# Patient Record
Sex: FEMALE | Race: WHITE | NOT HISPANIC OR LATINO | URBAN - METROPOLITAN AREA
[De-identification: names, ages, dates, MRNs, and addresses within clinical notes are randomized per-mention and may not be internally consistent; named-entity substitution may affect disease eponyms.]

---

## 2019-02-05 ENCOUNTER — EMERGENCY (EMERGENCY)
Facility: HOSPITAL | Age: 24
LOS: 0 days | Discharge: HOME | End: 2019-02-05
Attending: EMERGENCY MEDICINE | Admitting: EMERGENCY MEDICINE
Payer: COMMERCIAL

## 2019-02-05 VITALS
TEMPERATURE: 98 F | DIASTOLIC BLOOD PRESSURE: 80 MMHG | RESPIRATION RATE: 16 BRPM | SYSTOLIC BLOOD PRESSURE: 114 MMHG | HEART RATE: 73 BPM | OXYGEN SATURATION: 100 %

## 2019-02-05 VITALS — WEIGHT: 121.92 LBS

## 2019-02-05 DIAGNOSIS — R29.898 OTHER SYMPTOMS AND SIGNS INVOLVING THE MUSCULOSKELETAL SYSTEM: ICD-10-CM

## 2019-02-05 DIAGNOSIS — Z86.19 PERSONAL HISTORY OF OTHER INFECTIOUS AND PARASITIC DISEASES: ICD-10-CM

## 2019-02-05 DIAGNOSIS — R20.0 ANESTHESIA OF SKIN: ICD-10-CM

## 2019-02-05 DIAGNOSIS — H02.843 EDEMA OF RIGHT EYE, UNSPECIFIED EYELID: ICD-10-CM

## 2019-02-05 DIAGNOSIS — R20.2 PARESTHESIA OF SKIN: ICD-10-CM

## 2019-02-05 LAB
ALBUMIN SERPL ELPH-MCNC: 4.7 G/DL — SIGNIFICANT CHANGE UP (ref 3.3–5)
ALP SERPL-CCNC: 61 U/L — SIGNIFICANT CHANGE UP (ref 40–120)
ALT FLD-CCNC: 81 U/L — HIGH (ref 12–78)
ANION GAP SERPL CALC-SCNC: 6 MMOL/L — SIGNIFICANT CHANGE UP (ref 5–17)
APTT BLD: 36 SEC — SIGNIFICANT CHANGE UP (ref 27.5–36.3)
AST SERPL-CCNC: 31 U/L — SIGNIFICANT CHANGE UP (ref 15–37)
BASOPHILS # BLD AUTO: 0.03 K/UL — SIGNIFICANT CHANGE UP (ref 0–0.2)
BASOPHILS NFR BLD AUTO: 0.5 % — SIGNIFICANT CHANGE UP (ref 0–2)
BILIRUB SERPL-MCNC: 1.7 MG/DL — HIGH (ref 0.2–1.2)
BUN SERPL-MCNC: 10 MG/DL — SIGNIFICANT CHANGE UP (ref 7–23)
CALCIUM SERPL-MCNC: 9.2 MG/DL — SIGNIFICANT CHANGE UP (ref 8.5–10.1)
CHLORIDE SERPL-SCNC: 106 MMOL/L — SIGNIFICANT CHANGE UP (ref 96–108)
CO2 SERPL-SCNC: 27 MMOL/L — SIGNIFICANT CHANGE UP (ref 22–31)
CREAT SERPL-MCNC: 0.62 MG/DL — SIGNIFICANT CHANGE UP (ref 0.5–1.3)
EOSINOPHIL # BLD AUTO: 0.04 K/UL — SIGNIFICANT CHANGE UP (ref 0–0.5)
EOSINOPHIL NFR BLD AUTO: 0.7 % — SIGNIFICANT CHANGE UP (ref 0–6)
GLUCOSE SERPL-MCNC: 90 MG/DL — SIGNIFICANT CHANGE UP (ref 70–99)
HCT VFR BLD CALC: 40.9 % — SIGNIFICANT CHANGE UP (ref 34.5–45)
HGB BLD-MCNC: 13.8 G/DL — SIGNIFICANT CHANGE UP (ref 11.5–15.5)
IMM GRANULOCYTES NFR BLD AUTO: 0.2 % — SIGNIFICANT CHANGE UP (ref 0–1.5)
INR BLD: 1.24 RATIO — HIGH (ref 0.88–1.16)
LYMPHOCYTES # BLD AUTO: 1.85 K/UL — SIGNIFICANT CHANGE UP (ref 1–3.3)
LYMPHOCYTES # BLD AUTO: 32 % — SIGNIFICANT CHANGE UP (ref 13–44)
MCHC RBC-ENTMCNC: 30.5 PG — SIGNIFICANT CHANGE UP (ref 27–34)
MCHC RBC-ENTMCNC: 33.7 GM/DL — SIGNIFICANT CHANGE UP (ref 32–36)
MCV RBC AUTO: 90.5 FL — SIGNIFICANT CHANGE UP (ref 80–100)
MONOCYTES # BLD AUTO: 0.29 K/UL — SIGNIFICANT CHANGE UP (ref 0–0.9)
MONOCYTES NFR BLD AUTO: 5 % — SIGNIFICANT CHANGE UP (ref 2–14)
NEUTROPHILS # BLD AUTO: 3.56 K/UL — SIGNIFICANT CHANGE UP (ref 1.8–7.4)
NEUTROPHILS NFR BLD AUTO: 61.6 % — SIGNIFICANT CHANGE UP (ref 43–77)
NRBC # BLD: 0 /100 WBCS — SIGNIFICANT CHANGE UP (ref 0–0)
PLATELET # BLD AUTO: 233 K/UL — SIGNIFICANT CHANGE UP (ref 150–400)
POTASSIUM SERPL-MCNC: 4 MMOL/L — SIGNIFICANT CHANGE UP (ref 3.5–5.3)
POTASSIUM SERPL-SCNC: 4 MMOL/L — SIGNIFICANT CHANGE UP (ref 3.5–5.3)
PROT SERPL-MCNC: 8.6 GM/DL — HIGH (ref 6–8.3)
PROTHROM AB SERPL-ACNC: 13.9 SEC — HIGH (ref 10–12.9)
RBC # BLD: 4.52 M/UL — SIGNIFICANT CHANGE UP (ref 3.8–5.2)
RBC # FLD: 12 % — SIGNIFICANT CHANGE UP (ref 10.3–14.5)
SODIUM SERPL-SCNC: 139 MMOL/L — SIGNIFICANT CHANGE UP (ref 135–145)
WBC # BLD: 5.78 K/UL — SIGNIFICANT CHANGE UP (ref 3.8–10.5)
WBC # FLD AUTO: 5.78 K/UL — SIGNIFICANT CHANGE UP (ref 3.8–10.5)

## 2019-02-05 PROCEDURE — 70450 CT HEAD/BRAIN W/O DYE: CPT | Mod: 26

## 2019-02-05 PROCEDURE — 99284 EMERGENCY DEPT VISIT MOD MDM: CPT

## 2019-02-05 RX ORDER — VALACYCLOVIR 500 MG/1
1 TABLET, FILM COATED ORAL
Qty: 21 | Refills: 0 | OUTPATIENT
Start: 2019-02-05 | End: 2019-02-11

## 2019-02-05 NOTE — ED STATDOCS - OBJECTIVE STATEMENT
23 y/o female with a PMHx of shingles x2 presents to the ED c/o right arm weakness today. Pt woke up this morning with right eye swelling and was told "she has a lot of white blood cells in her eye" and told her to return tomorrow for further testing. Given steroid eye drop with improvement of right eye swelling. Pt also has rhinorrhea and right arm weakness and numbness. Denies neck pain. Optometrist: Dr. Dao.

## 2019-02-05 NOTE — ED STATDOCS - PROGRESS NOTE DETAILS
23 y/o female with a PMHx of shingles x2 presents to the ED c/o right arm weakness today. Pt woke up this morning with right eye swelling and was told "she has a lot of white blood cells in her eye" and told her to return tomorrow for further testing. Given steroid eye drop with improvement of right eye swelling. Pt also has rhinorrhea and right arm weakness and numbness. -Manoj Machuca PA-C Results reviewed and discussed with pt. Sx feel similar to prior shingles, will dc with valacylovir. Pt is well appearing and stable for dc. Discussed importance of close FU with PMD.  Pt asked to return to ED immediately for any new or concerning sx or worsening sx. Pt acknowledges and understands plan. -Manoj Machuca PA-C

## 2019-02-05 NOTE — ED STATDOCS - MEDICAL DECISION MAKING DETAILS
No objective neurologic findings on exam.  CT head negative.  Pt states feels similar to prior shingles episodes.  Will treat in case and patient to keep eye out for rash.  Will also refer to neurology in case there is neurologic cause of her paresthesias, like MS, which would need to be further worked up as outpatient.

## 2019-02-05 NOTE — ED ADULT TRIAGE NOTE - CHIEF COMPLAINT QUOTE
Pt presents to ED c/o weakness in right arm starting this morning around 10am. Pt reports she was seen by eye doc after noting that she had swelling of the right eye this morning who told her "she has a lot of white blood cells in her right eye". Pt does not have a facial droop. Pt denies oral contraceptives and smoking. Pt denies fever. Pt reports numbness in the right side of her face that resolved prior to the weakness in her arm.

## 2019-02-05 NOTE — ED ADULT NURSE NOTE - OBJECTIVE STATEMENT
right arm weakness today. Pt woke up this morning with right eye swelling and was told "she has a lot of white blood cells in her eye" and told her to return tomorrow for further testing.

## 2020-04-26 ENCOUNTER — MESSAGE (OUTPATIENT)
Age: 25
End: 2020-04-26

## 2020-05-10 LAB
SARS-COV-2 IGG SERPL IA-ACNC: <0.1 INDEX
SARS-COV-2 IGG SERPL QL IA: NEGATIVE

## 2021-01-09 DIAGNOSIS — Z11.59 ENCOUNTER FOR SCREENING FOR OTHER VIRAL DISEASES: ICD-10-CM

## 2021-01-10 ENCOUNTER — OUTPATIENT (OUTPATIENT)
Dept: OUTPATIENT SERVICES | Facility: HOSPITAL | Age: 26
LOS: 1 days | End: 2021-01-10
Payer: COMMERCIAL

## 2021-01-10 DIAGNOSIS — Z20.828 CONTACT WITH AND (SUSPECTED) EXPOSURE TO OTHER VIRAL COMMUNICABLE DISEASES: ICD-10-CM

## 2021-01-10 PROBLEM — B02.9 ZOSTER WITHOUT COMPLICATIONS: Chronic | Status: ACTIVE | Noted: 2019-02-06

## 2021-01-10 PROCEDURE — U0005: CPT

## 2021-01-10 PROCEDURE — U0003: CPT

## 2021-01-10 PROCEDURE — C9803: CPT

## 2021-01-11 DIAGNOSIS — Z20.828 CONTACT WITH AND (SUSPECTED) EXPOSURE TO OTHER VIRAL COMMUNICABLE DISEASES: ICD-10-CM

## 2021-01-11 LAB — SARS-COV-2 RNA SPEC QL NAA+PROBE: SIGNIFICANT CHANGE UP

## 2021-07-14 ENCOUNTER — NON-APPOINTMENT (OUTPATIENT)
Age: 26
End: 2021-07-14

## 2021-07-20 ENCOUNTER — APPOINTMENT (OUTPATIENT)
Dept: INTERNAL MEDICINE | Facility: CLINIC | Age: 26
End: 2021-07-20
Payer: COMMERCIAL

## 2021-07-20 VITALS
BODY MASS INDEX: 19.62 KG/M2 | HEIGHT: 67 IN | RESPIRATION RATE: 12 BRPM | HEART RATE: 86 BPM | WEIGHT: 125 LBS | DIASTOLIC BLOOD PRESSURE: 64 MMHG | SYSTOLIC BLOOD PRESSURE: 104 MMHG

## 2021-07-20 DIAGNOSIS — Z13.31 ENCOUNTER FOR SCREENING FOR DEPRESSION: ICD-10-CM

## 2021-07-20 DIAGNOSIS — Z78.9 OTHER SPECIFIED HEALTH STATUS: ICD-10-CM

## 2021-07-20 DIAGNOSIS — Z13.39 ENCOUNTER FOR SCREENING EXAM FOR OTHER MENTAL HEALTH AND BEHAVIORAL DISORDERS: ICD-10-CM

## 2021-07-20 DIAGNOSIS — Z00.00 ENCOUNTER FOR GENERAL ADULT MEDICAL EXAMINATION W/OUT ABNORMAL FINDINGS: ICD-10-CM

## 2021-07-20 PROCEDURE — G0442 ANNUAL ALCOHOL SCREEN 15 MIN: CPT | Mod: NC

## 2021-07-20 PROCEDURE — 99072 ADDL SUPL MATRL&STAF TM PHE: CPT

## 2021-07-20 PROCEDURE — 99385 PREV VISIT NEW AGE 18-39: CPT | Mod: 25

## 2021-07-20 PROCEDURE — 36415 COLL VENOUS BLD VENIPUNCTURE: CPT

## 2021-07-20 PROCEDURE — G0444 DEPRESSION SCREEN ANNUAL: CPT | Mod: NC,59

## 2021-07-20 NOTE — HISTORY OF PRESENT ILLNESS
[FreeTextEntry1] : wellness exam [de-identified] : Ms. STACEY ACOSTA  is    26 year female . pt.have no known medical history.\par Pt. is over all feeling well.\par No change in weight.\par No change is bowel and bladder habit.\par No trouble sleeping at night.\par Not on pain.\par

## 2021-07-20 NOTE — ASSESSMENT
[FreeTextEntry1] : HM:\par will do comprehensive blood work , screen for hyperlipidemia , diabetes and thyroid disorder.\par Titers done for MMR, varicella, quantiferon TB for job.\par labs drawn in the office.\par will review the results and address if abnormal.\par she is due for  PAP : result  normal.\par up to date with  tdap,\par Not got  covid vaccine.\par Declined for HIV and Hep C  screening test.\par alcohol screening :SIBRT:1\par Depression screening:PHQ2:0\par \par Recommended:\par Being physically active\par Maintaining a healthy weight\par Eating a diet rich in fruits, vegetables, whole grains, and low in saturated/trans fat\par Limiting alcohol consumption\par safe sexual practice.\par Avoiding excess sun exposure.using sunscreen.\par .\par

## 2021-07-20 NOTE — HEALTH RISK ASSESSMENT
[Excellent] : ~his/her~  mood as  excellent [Monthly or less (1 pt)] : Monthly or less (1 point) [Never (0 pts)] : Never (0 points) [No] : In the past 12 months have you used drugs other than those required for medical reasons? No [No falls in past year] : Patient reported no falls in the past year [0] : 2) Feeling down, depressed, or hopeless: Not at all (0) [Patient reported PAP Smear was normal] : Patient reported PAP Smear was normal [HIV test declined] : HIV test declined [Hepatitis C test declined] : Hepatitis C test declined [None] : None [With Family] : lives with family [Employed] : employed [Student] : student [] :  [Sexually Active] : sexually active [Feels Safe at Home] : Feels safe at home [Fully functional (bathing, dressing, toileting, transferring, walking, feeding)] : Fully functional (bathing, dressing, toileting, transferring, walking, feeding) [Fully functional (using the telephone, shopping, preparing meals, housekeeping, doing laundry, using] : Fully functional and needs no help or supervision to perform IADLs (using the telephone, shopping, preparing meals, housekeeping, doing laundry, using transportation, managing medications and managing finances) [Smoke Detector] : smoke detector [Carbon Monoxide Detector] : carbon monoxide detector [Seat Belt] :  uses seat belt [Sunscreen] : uses sunscreen [] : No [de-identified] : exercise regulalrly [[id number]] : 0 [Change in mental status noted] : No change in mental status noted [Language] : denies difficulty with language [Behavior] : denies difficulty with behavior [Reports changes in vision] : Reports no changes in vision [Reports changes in dental health] : Reports no changes in dental health [PapSmearDate] : 10/2019

## 2021-07-22 LAB
ALBUMIN SERPL ELPH-MCNC: 5 G/DL
ALP BLD-CCNC: 64 U/L
ALT SERPL-CCNC: 32 U/L
ANION GAP SERPL CALC-SCNC: 12 MMOL/L
APPEARANCE: CLEAR
AST SERPL-CCNC: 20 U/L
BACTERIA: NEGATIVE
BASOPHILS # BLD AUTO: 0.04 K/UL
BASOPHILS NFR BLD AUTO: 0.9 %
BILIRUB SERPL-MCNC: 1.4 MG/DL
BILIRUBIN URINE: NEGATIVE
BLOOD URINE: NEGATIVE
BUN SERPL-MCNC: 13 MG/DL
CALCIUM SERPL-MCNC: 10 MG/DL
CHLORIDE SERPL-SCNC: 105 MMOL/L
CHOLEST SERPL-MCNC: 135 MG/DL
CO2 SERPL-SCNC: 24 MMOL/L
COLOR: NORMAL
CREAT SERPL-MCNC: 0.7 MG/DL
EOSINOPHIL # BLD AUTO: 0.06 K/UL
EOSINOPHIL NFR BLD AUTO: 1.4 %
GLUCOSE QUALITATIVE U: NEGATIVE
GLUCOSE SERPL-MCNC: 98 MG/DL
HBV SURFACE AB SER QL: REACTIVE
HCT VFR BLD CALC: 43.2 %
HDLC SERPL-MCNC: 60 MG/DL
HGB BLD-MCNC: 13.8 G/DL
HYALINE CASTS: 1 /LPF
IMM GRANULOCYTES NFR BLD AUTO: 0.2 %
KETONES URINE: NEGATIVE
LDLC SERPL CALC-MCNC: 63 MG/DL
LEUKOCYTE ESTERASE URINE: NEGATIVE
LYMPHOCYTES # BLD AUTO: 1.84 K/UL
LYMPHOCYTES NFR BLD AUTO: 42.6 %
M TB IFN-G BLD-IMP: NEGATIVE
MAN DIFF?: NORMAL
MCHC RBC-ENTMCNC: 30.5 PG
MCHC RBC-ENTMCNC: 31.9 GM/DL
MCV RBC AUTO: 95.4 FL
MEV IGG FLD QL IA: 38.1 AU/ML
MEV IGG+IGM SER-IMP: POSITIVE
MICROSCOPIC-UA: NORMAL
MONOCYTES # BLD AUTO: 0.25 K/UL
MONOCYTES NFR BLD AUTO: 5.8 %
MUV AB SER-ACNC: POSITIVE
MUV IGG SER QL IA: 221 AU/ML
NEUTROPHILS # BLD AUTO: 2.12 K/UL
NEUTROPHILS NFR BLD AUTO: 49.1 %
NITRITE URINE: NEGATIVE
NONHDLC SERPL-MCNC: 75 MG/DL
PH URINE: 5.5
PLATELET # BLD AUTO: 194 K/UL
POTASSIUM SERPL-SCNC: 4.3 MMOL/L
PROT SERPL-MCNC: 7.4 G/DL
PROTEIN URINE: NEGATIVE
QUANTIFERON TB PLUS MITOGEN MINUS NIL: >10 IU/ML
QUANTIFERON TB PLUS NIL: 0.02 IU/ML
QUANTIFERON TB PLUS TB1 MINUS NIL: 0 IU/ML
QUANTIFERON TB PLUS TB2 MINUS NIL: 0 IU/ML
RBC # BLD: 4.53 M/UL
RBC # FLD: 12.5 %
RED BLOOD CELLS URINE: 1 /HPF
RUBV IGG FLD-ACNC: 10 INDEX
RUBV IGG SER-IMP: POSITIVE
SODIUM SERPL-SCNC: 141 MMOL/L
SPECIFIC GRAVITY URINE: 1.02
SQUAMOUS EPITHELIAL CELLS: 2 /HPF
TRIGL SERPL-MCNC: 60 MG/DL
TSH SERPL-ACNC: 2.68 UIU/ML
UROBILINOGEN URINE: NORMAL
VZV AB TITR SER: POSITIVE
VZV IGG SER IF-ACNC: 2252 INDEX
WBC # FLD AUTO: 4.32 K/UL
WHITE BLOOD CELLS URINE: 0 /HPF

## 2021-07-23 ENCOUNTER — NON-APPOINTMENT (OUTPATIENT)
Age: 26
End: 2021-07-23

## 2021-09-07 ENCOUNTER — TRANSCRIPTION ENCOUNTER (OUTPATIENT)
Age: 26
End: 2021-09-07

## 2021-09-29 ENCOUNTER — APPOINTMENT (OUTPATIENT)
Dept: INTERNAL MEDICINE | Facility: CLINIC | Age: 26
End: 2021-09-29

## 2022-04-11 PROBLEM — Z11.59 SCREENING FOR VIRAL DISEASE: Status: RESOLVED | Noted: 2021-01-09 | Resolved: 2021-01-11
